# Patient Record
Sex: FEMALE | Race: WHITE
[De-identification: names, ages, dates, MRNs, and addresses within clinical notes are randomized per-mention and may not be internally consistent; named-entity substitution may affect disease eponyms.]

---

## 2017-07-26 NOTE — CR
EXAMINATION: Abdomen

 

HISTORY: Pain

 

COMPARISON: None

 

TECHNIQUE: AP and upright views

 

FINDINGS: There is no free air under the diaphragm. Cholecystectomy clips are noted. There is a nono
bstructive bowel gas pattern. No abnormal calcifications project over the kidneys. There is an IUD p
rojecting over the pelvis. Visualized osseous structures appear normal.

 

IMPRESSION: 

1. Nonobstructive bowel gas pattern.

## 2017-07-26 NOTE — EDM.PDOC
ED HPI GENERAL MEDICAL PROBLEM





- General


Chief Complaint: Abdominal Pain


Stated Complaint: ILL, STOMACH PAIN


Time Seen by Provider: 07/26/17 10:31


Source of Information: Reports: Patient


History Limitations: Reports: No Limitations





- History of Present Illness


INITIAL COMMENTS - FREE TEXT/NARRATIVE: 





HISTORY AND PHYSICAL:


[]37-year-old female presenting with abdominal pain





History of Present Illness:


[]Patient had not had a bowel movement for 5 days yes today had a bowel 

movement continues to have pain across the lower abdomen





Review of Systems:


As per history of present illness and below otherwise all 


systems reviewed and negative.  





Past medical history:


As per history of present illness and as reviewed below


otherwise noncontributory.





Surgical history:


As per history of present illness and as reviewed below


otherwise noncontributory.





Social history:


No reported history of drug or alcohol abuse.





Family history:


As per history of present illness and as reviewed below


otherwise noncontributory.





Physical exam:


Alert and oriented female answering questions appropriately exquisitely tender 

across lower abdomen no rebound no guarding


HEENT: Atraumatic, normocehpalic, pupils reactive, negative for conjunctival 

pallor or scleral icterus, mucous membranes moist, throat clear, neck supple, 

nontender, trachea midline.  


Lungs: Clear to auscultation, breath sounds equal bilaterally, chest non 

tender.  


Heart: S1S2, regular, negative for clicks, rubs, or JVD.


Abdomen: Soft, nondistended, nontender.  Negative for masses or 

hepatossplenmegaly. Positive for left costovertebral tenderness.


Pelvis: Stable nontender.


Genitourinary: Deferred.


Rectal: Deferred


Extremities: Atraumatic, negative for cords or calf pain.  


Neurovascular unremarkable.


Neuro:  Awake, alert, oriented.  Cranial nerves II through XII


unremarkable.  Cerebellum unremarkable.  Motor and sensory unremarkable 

throughout.  Exam nonfocal.  


Discussed with patient the positive findings of a urinary tract infection and 

her tenderness to the left kidney





Diagnostics:


[CBC CMP UA urine drug screen flat and upright abdomen pelvis





Therapeutics:


[]





Impression:


[Urinary tract infection early pyelonephritis]





Plan:


[]Ciprofloxacin 500 mg twice a day 7 days follow-up with your primary care 

provider in one week


Pyridium 100 mg 3 times a day 3 days





Definitive disposition and diagnosis as appropriate pending


reevaluation and review of above.  





Onset: Gradual


Duration: Day(s):


Location: Reports: Abdomen


Quality: Reports: Ache, Throbbing


Severity: Moderate


Improves with: Reports: None


Worsens with: Reports: None


  ** Right Abdomen


Pain Score (Numeric/FACES): 7





- Related Data


 Allergies











Allergy/AdvReac Type Severity Reaction Status Date / Time


 


No Known Allergies Allergy   Verified 07/26/17 10:21











Home Meds: 


 Home Meds





Ciprofloxacin HCl [Cipro] 500 mg PO Q12HR #14 tablet 07/26/17 [Rx]


Citalopram [Celexa] 40 mg DAILY 07/26/17 [History]


Levonorgestrel [Mirena]  07/26/17 [History]


Ondansetron HCl [Zofran] 8 mg TID PRN 07/26/17 [History]


Phenazopyridine HCl [Pyridium] 100 mg PO TID #9 tablet 07/26/17 [Rx]











ED ROS GENERAL





- Review of Systems


Review Of Systems: ROS reveals no pertinent complaints other than HPI.





ED EXAM, GI/ABD





- Physical Exam


Exam: See Below (See dictation)





Course





- Vital Signs


Last Recorded V/S: 


 Last Vital Signs











Temp  36.2 C   07/26/17 10:25


 


Pulse  77   07/26/17 11:51


 


Resp  18   07/26/17 11:51


 


BP  128/62   07/26/17 11:51


 


Pulse Ox  98   07/26/17 11:51














- Orders/Labs/Meds


Orders: 


 Active Orders 24 hr











 Category Date Time Status


 


 Sodium Chloride 0.9% [Saline Flush] Med  07/26/17 10:36 Active





 10 ml FLUSH ASDIRECTED PRN   


 


 Sodium Chloride 0.9% [Saline Flush] Med  07/26/17 10:36 Active





 2.5 ml FLUSH ASDIRECTED PRN   


 


 Saline Lock Insert [OM.PC] Stat Oth  07/26/17 10:36 Ordered








 Medication Orders





Sodium Chloride (Saline Flush)  10 ml FLUSH ASDIRECTED PRN


   PRN Reason: Keep Vein Open


Sodium Chloride (Saline Flush)  2.5 ml FLUSH ASDIRECTED PRN


   PRN Reason: Keep Vein Open








Labs: 


 Laboratory Tests











  07/26/17 07/26/17 07/26/17 Range/Units





  10:46 10:46 10:55 


 


WBC  8.63    (4.0-11.0)  K/uL


 


RBC  4.63    (4.30-5.90)  M/uL


 


Hgb  13.3    (12.0-16.0)  g/dL


 


Hct  40.6    (36.0-46.0)  %


 


MCV  87.7    (80.0-98.0)  fL


 


MCH  28.7    (27.0-32.0)  pg


 


MCHC  32.8    (31.0-37.0)  g/dL


 


RDW Std Deviation  43.8    (28.0-62.0)  fl


 


RDW Coeff of Ravi  14    (11.0-15.0)  %


 


Plt Count  337    (150-400)  K/uL


 


MPV  8.90    (7.40-12.00)  fL


 


Neut % (Auto)  63.0    (48.0-80.0)  %


 


Lymph % (Auto)  28.9    (16.0-40.0)  %


 


Mono % (Auto)  5.9    (0.0-15.0)  %


 


Eos % (Auto)  1.7    (0.0-7.0)  %


 


Baso % (Auto)  0.5    (0.0-1.5)  %


 


Neut # (Auto)  5.4    (1.4-5.7)  K/uL


 


Lymph # (Auto)  2.5 H    (0.6-2.4)  K/uL


 


Mono # (Auto)  0.5    (0.0-0.8)  K/uL


 


Eos # (Auto)  0.2    (0.0-0.7)  K/uL


 


Baso # (Auto)  0.0    (0.0-0.1)  K/uL


 


Nucleated RBC %  0.0    /100WBC


 


Nucleated RBCs #  0    K/uL


 


Sodium   138   (136-146)  mmol/L


 


Potassium   3.8   (3.5-5.1)  mmol/L


 


Chloride   104   ()  mmol/L


 


Carbon Dioxide   26   (21-31)  mmol/L


 


BUN   8   (6.0-23.0)  mg/dL


 


Creatinine   0.8   (0.6-1.5)  mg/dL


 


Est Cr Clr Drug Dosing   TNP   


 


Estimated GFR (MDRD)   > 60.0   ml/min


 


Glucose   78   ()  mg/dL


 


Calcium   9.6   (8.8-10.8)  mg/dL


 


Total Bilirubin   0.3   (0.1-1.5)  mg/dL


 


AST   16   (5-40)  IU/L


 


ALT   23   (8-54)  IU/L


 


Alkaline Phosphatase   83   ()  


 


Total Protein   7.5   (6.0-8.0)  g/dL


 


Albumin   4.1   (3.5-5.0)  g/dL


 


Globulin   3.4   (2.0-3.5)  g/dL


 


Albumin/Globulin Ratio   1.2 L   (1.3-2.8)  


 


Urine Color     


 


Urine Appearance     


 


Urine pH     (5.0-8.0)  


 


Ur Specific Gravity     (1.001-1.035)  


 


Urine Protein     (NEGATIVE)  mg/dL


 


Urine Glucose (UA)     (NEGATIVE)  mg/dL


 


Urine Ketones     (NEGATIVE)  mg/dL


 


Urine Occult Blood     (NEGATIVE)  


 


Urine Nitrite     (NEGATIVE)  


 


Urine Bilirubin     (NEGATIVE)  


 


Urine Urobilinogen     (<2.0)  EU/dL


 


Ur Leukocyte Esterase     (NEGATIVE)  


 


Urine RBC     (0-2/HPF)  


 


Urine WBC     (0-5/HPF)  


 


Ur Epithelial Cells     (NONE-FEW)  


 


Urine Bacteria     (NEGATIVE)  


 


Urine HCG, Qual    NEGATIVE  (NEGATIVE)  














  07/26/17 Range/Units





  10:55 


 


WBC   (4.0-11.0)  K/uL


 


RBC   (4.30-5.90)  M/uL


 


Hgb   (12.0-16.0)  g/dL


 


Hct   (36.0-46.0)  %


 


MCV   (80.0-98.0)  fL


 


MCH   (27.0-32.0)  pg


 


MCHC   (31.0-37.0)  g/dL


 


RDW Std Deviation   (28.0-62.0)  fl


 


RDW Coeff of Ravi   (11.0-15.0)  %


 


Plt Count   (150-400)  K/uL


 


MPV   (7.40-12.00)  fL


 


Neut % (Auto)   (48.0-80.0)  %


 


Lymph % (Auto)   (16.0-40.0)  %


 


Mono % (Auto)   (0.0-15.0)  %


 


Eos % (Auto)   (0.0-7.0)  %


 


Baso % (Auto)   (0.0-1.5)  %


 


Neut # (Auto)   (1.4-5.7)  K/uL


 


Lymph # (Auto)   (0.6-2.4)  K/uL


 


Mono # (Auto)   (0.0-0.8)  K/uL


 


Eos # (Auto)   (0.0-0.7)  K/uL


 


Baso # (Auto)   (0.0-0.1)  K/uL


 


Nucleated RBC %   /100WBC


 


Nucleated RBCs #   K/uL


 


Sodium   (136-146)  mmol/L


 


Potassium   (3.5-5.1)  mmol/L


 


Chloride   ()  mmol/L


 


Carbon Dioxide   (21-31)  mmol/L


 


BUN   (6.0-23.0)  mg/dL


 


Creatinine   (0.6-1.5)  mg/dL


 


Est Cr Clr Drug Dosing   


 


Estimated GFR (MDRD)   ml/min


 


Glucose   ()  mg/dL


 


Calcium   (8.8-10.8)  mg/dL


 


Total Bilirubin   (0.1-1.5)  mg/dL


 


AST   (5-40)  IU/L


 


ALT   (8-54)  IU/L


 


Alkaline Phosphatase   ()  


 


Total Protein   (6.0-8.0)  g/dL


 


Albumin   (3.5-5.0)  g/dL


 


Globulin   (2.0-3.5)  g/dL


 


Albumin/Globulin Ratio   (1.3-2.8)  


 


Urine Color  YELLOW  


 


Urine Appearance  HAZY  


 


Urine pH  6.0  (5.0-8.0)  


 


Ur Specific Gravity  1.025  (1.001-1.035)  


 


Urine Protein  NEGATIVE  (NEGATIVE)  mg/dL


 


Urine Glucose (UA)  NEGATIVE  (NEGATIVE)  mg/dL


 


Urine Ketones  NEGATIVE  (NEGATIVE)  mg/dL


 


Urine Occult Blood  SMALL H  (NEGATIVE)  


 


Urine Nitrite  POSITIVE H  (NEGATIVE)  


 


Urine Bilirubin  NEGATIVE  (NEGATIVE)  


 


Urine Urobilinogen  0.2  (<2.0)  EU/dL


 


Ur Leukocyte Esterase  LARGE  (NEGATIVE)  


 


Urine RBC  2-4  (0-2/HPF)  


 


Urine WBC  TO NUMEROUS TO COUNT H  (0-5/HPF)  


 


Ur Epithelial Cells  FEW  (NONE-FEW)  


 


Urine Bacteria  2+ H  (NEGATIVE)  


 


Urine HCG, Qual   (NEGATIVE)  











Meds: 


Medications











Generic Name Dose Route Start Last Admin





  Trade Name Freq  PRN Reason Stop Dose Admin


 


Sodium Chloride  10 ml  07/26/17 10:36  





  Saline Flush  FLUSH   





  ASDIRECTED PRN   





  Keep Vein Open   


 


Sodium Chloride  2.5 ml  07/26/17 10:36  





  Saline Flush  FLUSH   





  ASDIRECTED PRN   





  Keep Vein Open   














Departure





- Departure


Time of Disposition: 11:56


Disposition: Home, Self-Care 01


Condition: Good


Clinical Impression: 


 Pyelonephritis





UTI (urinary tract infection)


Qualifiers:


 Urinary tract infection type: acute cystitis Hematuria presence: without 

hematuria Qualified Code(s): N30.00 - Acute cystitis without hematuria








- Discharge Information


Prescriptions: 


Ciprofloxacin HCl [Cipro] 500 mg PO Q12HR #14 tablet


Phenazopyridine HCl [Pyridium] 100 mg PO TID #9 tablet


Instructions:  Abdominal Pain, Adult, Easy-to-Read


Forms:  ED Department Discharge


Additional Instructions: 


The following information is given to patients seen in the emergency department 

who are being discharged to home. This information is to outline your options 

for follow-up care. We provide all patients seen in our emergency department 

with a follow-up referral.





The need for follow-up, as well as the timing and circumstances, are variable 

depending upon the specifics of your emergency department visit.





If you don't have a primary care physician on staff, we will provide you with a 

referral. We always advise you to contact your personal physician following an 

emergency department visit to inform them of the circumstance of the visit and 

for follow-up with them and/or the need for any referrals to a consulting 

specialist.





The emergency department will also refer you to a specialist when appropriate. 

This referral assures that you have the opportunity for followup care with a 

specialist. All of these measure are taken in an effort to provide you with 

optimal care, which includes your followup.





Under all circumstances we always encourage you to contact your private 

physician who remains a resource for coordinating  your care. When calling for 

followup care, please make the office aware that this follow-up is from your 

recent emergency room visit. If for any reason you are refused follow-up, 

please contact the [facility] emergency department at [phone] 

and asked to speak to the emergency department charge nurse.





Prescriptions have been electronically sent to service drug per your request


Pyridium 100 mg 3 times daily 3 days


Ciprofloxacin 500 mg twice a day 7 days





Follow-up with your primary care provider in one week to have urine reevaluated





- My Orders


Last 24 Hours: 


My Active Orders





07/26/17 10:36


Sodium Chloride 0.9% [Saline Flush]   10 ml FLUSH ASDIRECTED PRN 


Sodium Chloride 0.9% [Saline Flush]   2.5 ml FLUSH ASDIRECTED PRN 


Saline Lock Insert [OM.PC] Stat 














- Assessment/Plan


Last 24 Hours: 


My Active Orders





07/26/17 10:36


Sodium Chloride 0.9% [Saline Flush]   10 ml FLUSH ASDIRECTED PRN 


Sodium Chloride 0.9% [Saline Flush]   2.5 ml FLUSH ASDIRECTED PRN 


Saline Lock Insert [OM.PC] Stat

## 2017-07-28 NOTE — EDM.PDOC
ED HPI GENERAL MEDICAL PROBLEM





- General


Chief Complaint: Abdominal Pain


Stated Complaint: ABDOMINAL PAIN


Time Seen by Provider: 17 19:25





- History of Present Illness


INITIAL COMMENTS - FREE TEXT/NARRATIVE: 





HISTORY AND PHYSICAL:





History of present illness:


The patient is a 37-year-old female who was seen here 2 days ago for 3 weeks of 

right flank and right lower abdominal pain associated with nausea and vomiting. 

She was worked up with abdominal x-rays labs UA and urine culture and was 

diagnosed with a UTI and placed on Cipro and radium. Her urine culture has been 

checked by me and the Escherichia coli UTI is sensitive to Cipro. The patient 

presents today with persistent pain and not feeling much better and saying that 

the pyridium is making her vomit. She has had intractable vomiting today but no 

diarrhea and is in fact been constipated. The patient says that everything 

started about 3 weeks ago when she had the "stomach flu" where she was having 

loose stools stomach pain and some vomiting. That improved and then she became 

more constipated and then Restarted having the abdominal pain but it was more 

localized on the right side and in the suprapubic area. The patient has a 

Mirena and denies pregnancy and currently has not had fevers or chills chest 

pain or shortness of breath. She comes in tonight because she feels that she's 

not improving and now she is having intractable vomiting. She describes the 

pain as sharp and achy as well as crampy and it is now more diffuse across the 

whole lower abdominal area. She has a history of a cholecystectomy but no other 

gynecologic issues.





Review of systems: 


As per history of present illness and below otherwise all systems reviewed and 

negative.





Past medical history: 


As per history of present illness and as reviewed below otherwise 

noncontributory.





Surgical history: 


As per history of present illness and as reviewed below otherwise 

noncontributory.





Social history: 


No reported history of drug or alcohol abuse.





Family history: 


As per history of present illness and as reviewed below otherwise 

noncontributory.





Physical exam:


Gen.: Well-developed overweight female who is nontoxic but looks somewhat 

uncomfortable in the room. Vital signs been noted by me and she is afebrile


HEENT: Atraumatic, normocephalic, pupils reactive, negative for conjunctival 

pallor or scleral icterus, mucous membranes slightly tacky, throat clear, neck 

supple, nontender, trachea midline.


Lungs: Clear to auscultation, breath sounds equal bilaterally, chest nontender.


Heart: S1S2, regular, negative for clicks, rubs, or JVD.


Abdomen: Soft, nondistended, bowel sounds are hypoactive. The patient is 

diffusely tender across the entire lower abdominal area and has voluntary 

guarding but no involuntary guarding or rebound. Exam was somewhat exaggerated 

with even mild palpation. Negative for masses or hepatosplenomegaly. Negative 

for costovertebral tenderness.


Pelvis: Stable nontender.


Genitourinary: Deferred.


Rectal: Deferred.


Extremities: Atraumatic, negative for cords or calf pain. Neurovascular 

unremarkable.


Neuro: Awake, alert, oriented. Cranial nerves II through XII unremarkable. 

Cerebellum unremarkable. Motor and sensory unremarkable throughout. Exam 

nonfocal.





Diagnostics:


CBC CMP lipase UA UG CT scan of the abdomen and pelvis rocephin


I will not send a urine culture as she or he has one from 2 days ago but I will 

send a UA to evaluate improvement from the prior UA.





Therapeutics:


IV IV fluids Zofran Toradol Dilaudid


Patient continued to have dry heaves so was given Reglan and Benadryl





Patient is aware of all testing results including the CT scan and the findings 

of the 2 small left kidney stones in the left adnexal cyst. She is feeling much 

improved and is taking by mouth ice chips and a popsicle and is no longer 

having vomiting. I will give her Zofran for home as well as some tramadol and 

encouraged her to continue her antibiotics until they are finished and need for 

follow-up in the clinic. Advised on reasons to return to the ED. I advised her 

to stop taking the Pyridium as that seems distressed triggered her vomiting.


Impression: 


Abdominal pain and vomiting persistent history of UTI, improved





Definitive disposition and diagnosis as appropriate pending reevaluation and 

review of above.


  ** right flank


Pain Score (Numeric/FACES): 10





- Related Data


 Allergies











Allergy/AdvReac Type Severity Reaction Status Date / Time


 


No Known Allergies Allergy   Verified 17 19:14











Home Meds: 


 Home Meds





Ciprofloxacin HCl [Cipro] 500 mg PO Q12HR #14 tablet 17 [Rx]


Citalopram [Celexa] 40 mg DAILY 17 [History]


Levonorgestrel [Mirena]  17 [History]


Ondansetron HCl [Zofran] 8 mg TID PRN 17 [History]


Phenazopyridine HCl [Pyridium] 100 mg PO TID #9 tablet 17 [Rx]











Past Medical History





- Past Health History


Medical/Surgical History: Denies Medical/Surgical History


HEENT History: Reports: None


Cardiovascular History: Reports: None


Respiratory History: Reports: None


Gastrointestinal History: Reports: GERD


Genitourinary History: Reports: None


OB/GYN History: Reports: Pregnancy


Musculoskeletal History: Reports: None


Psychiatric History: Reports: None


Endocrine/Metabolic History: Reports: None


Hematologic History: Reports: None


Immunologic History: Reports: None


Oncologic (Cancer) History: Reports: None


Dermatologic History: Reports: None





- Past Surgical History


Head Surgeries/Procedures: Reports: None


GI Surgical History: Reports: Cholecystectomy, Colonoscopy, EGD


Female  Surgical History: Reports:  Section





Social & Family History





- Family History


Family Medical History: Noncontributory





- Tobacco Use


Smoking Status *Q: Never Smoker





- Caffeine Use


Caffeine Use: Reports: Soda





- Recreational Drug Use


Recreational Drug Use: No





ED ROS GENERAL





- Review of Systems


Review Of Systems: ROS reveals no pertinent complaints other than HPI.





ED EXAM, GENERAL





- Physical Exam


Exam: See Below (See dictation)





Course





- Vital Signs


Last Recorded V/S: 


 Last Vital Signs











Temp  36.5 C   17 19:15


 


Pulse  65   17 21:14


 


Resp  16   17 21:14


 


BP  131/77   17 21:14


 


Pulse Ox  97   17 21:14














- Orders/Labs/Meds


Orders: 


 Active Orders 24 hr











 Category Date Time Status


 


 Abdomen Pelvis wo Cont [CT] Stat Exams  17 19:31 Taken


 


 Sodium Chloride 0.9% [Saline Flush] Med  17 19:31 Active





 10 ml FLUSH ASDIRECTED PRN   


 


 Sodium Chloride 0.9% [Saline Flush] Med  17 19:31 Active





 2.5 ml FLUSH ASDIRECTED PRN   


 


 cefTRIAXone [Rocephin in Dextrose,Iso-Osm 1 GM/50 ML] 1 Med  17 21:39 

Active





 gm   





 Premix Bag 1 bag   





 IV ONETIME   


 


 Saline Lock Insert [OM.PC] Stat Oth  17 19:31 Ordered








 Medication Orders





Ceftriaxone Sodium/Dextrose 1 (gm/ Premix)  50 mls @ 100 mls/hr IV ONETIME ONE


   Stop: 17 22:08


Sodium Chloride (Saline Flush)  10 ml FLUSH ASDIRECTED PRN


   PRN Reason: Keep Vein Open


Sodium Chloride (Saline Flush)  2.5 ml FLUSH ASDIRECTED PRN


   PRN Reason: Keep Vein Open








Labs: 


 Laboratory Tests











  17 Range/Units





  19:30 19:30 19:30 


 


WBC  10.35    (4.0-11.0)  K/uL


 


RBC  4.82    (4.30-5.90)  M/uL


 


Hgb  13.6    (12.0-16.0)  g/dL


 


Hct  41.7    (36.0-46.0)  %


 


MCV  86.5    (80.0-98.0)  fL


 


MCH  28.2    (27.0-32.0)  pg


 


MCHC  32.6    (31.0-37.0)  g/dL


 


RDW Std Deviation  42.5    (28.0-62.0)  fl


 


RDW Coeff of Ravi  13    (11.0-15.0)  %


 


Plt Count  375    (150-400)  K/uL


 


MPV  9.00    (7.40-12.00)  fL


 


Neut % (Auto)  70.7    (48.0-80.0)  %


 


Lymph % (Auto)  23.6    (16.0-40.0)  %


 


Mono % (Auto)  4.4    (0.0-15.0)  %


 


Eos % (Auto)  1.0    (0.0-7.0)  %


 


Baso % (Auto)  0.3    (0.0-1.5)  %


 


Neut # (Auto)  7.3 H    (1.4-5.7)  K/uL


 


Lymph # (Auto)  2.4    (0.6-2.4)  K/uL


 


Mono # (Auto)  0.5    (0.0-0.8)  K/uL


 


Eos # (Auto)  0.1    (0.0-0.7)  K/uL


 


Baso # (Auto)  0.0    (0.0-0.1)  K/uL


 


Nucleated RBC %  0.0    /100WBC


 


Nucleated RBCs #  0    K/uL


 


Sodium   139   (136-146)  mmol/L


 


Potassium   3.9   (3.5-5.1)  mmol/L


 


Chloride   103   ()  mmol/L


 


Carbon Dioxide   27   (21-31)  mmol/L


 


BUN   8   (6.0-23.0)  mg/dL


 


Creatinine   0.9   (0.6-1.5)  mg/dL


 


Est Cr Clr Drug Dosing   TNP   


 


Estimated GFR (MDRD)   > 60.0   ml/min


 


Glucose   99   ()  mg/dL


 


Calcium   9.5   (8.8-10.8)  mg/dL


 


Total Bilirubin   0.5   (0.1-1.5)  mg/dL


 


AST   18   (5-40)  IU/L


 


ALT   21   (8-54)  IU/L


 


Alkaline Phosphatase   86   ()  


 


Total Protein   7.4   (6.0-8.0)  g/dL


 


Albumin   4.1   (3.5-5.0)  g/dL


 


Globulin   3.3   (2.0-3.5)  g/dL


 


Albumin/Globulin Ratio   1.2 L   (1.3-2.8)  


 


Lipase   20   (7-80)  U/L


 


HCG, Qual    NEGATIVE  (NEG)  


 


Urine Color     


 


Urine Appearance     


 


Urine pH     (5.0-8.0)  


 


Ur Specific Gravity     (1.001-1.035)  


 


Urine Protein     (NEGATIVE)  mg/dL


 


Urine Glucose (UA)     (NEGATIVE)  mg/dL


 


Urine Ketones     (NEGATIVE)  mg/dL


 


Urine Occult Blood     (NEGATIVE)  


 


Urine Nitrite     (NEGATIVE)  


 


Urine Bilirubin     (NEGATIVE)  


 


Urine Ictotest     


 


Urine Urobilinogen     (<2.0)  EU/dL


 


Ur Leukocyte Esterase     (NEGATIVE)  


 


Urine RBC     (0-2/HPF)  


 


Urine WBC     (0-5/HPF)  


 


Ur Epithelial Cells     (NONE-FEW)  


 


Urine Bacteria     (NEGATIVE)  














  17 Range/Units





  20:30 


 


WBC   (4.0-11.0)  K/uL


 


RBC   (4.30-5.90)  M/uL


 


Hgb   (12.0-16.0)  g/dL


 


Hct   (36.0-46.0)  %


 


MCV   (80.0-98.0)  fL


 


MCH   (27.0-32.0)  pg


 


MCHC   (31.0-37.0)  g/dL


 


RDW Std Deviation   (28.0-62.0)  fl


 


RDW Coeff of Ravi   (11.0-15.0)  %


 


Plt Count   (150-400)  K/uL


 


MPV   (7.40-12.00)  fL


 


Neut % (Auto)   (48.0-80.0)  %


 


Lymph % (Auto)   (16.0-40.0)  %


 


Mono % (Auto)   (0.0-15.0)  %


 


Eos % (Auto)   (0.0-7.0)  %


 


Baso % (Auto)   (0.0-1.5)  %


 


Neut # (Auto)   (1.4-5.7)  K/uL


 


Lymph # (Auto)   (0.6-2.4)  K/uL


 


Mono # (Auto)   (0.0-0.8)  K/uL


 


Eos # (Auto)   (0.0-0.7)  K/uL


 


Baso # (Auto)   (0.0-0.1)  K/uL


 


Nucleated RBC %   /100WBC


 


Nucleated RBCs #   K/uL


 


Sodium   (136-146)  mmol/L


 


Potassium   (3.5-5.1)  mmol/L


 


Chloride   ()  mmol/L


 


Carbon Dioxide   (21-31)  mmol/L


 


BUN   (6.0-23.0)  mg/dL


 


Creatinine   (0.6-1.5)  mg/dL


 


Est Cr Clr Drug Dosing   


 


Estimated GFR (MDRD)   ml/min


 


Glucose   ()  mg/dL


 


Calcium   (8.8-10.8)  mg/dL


 


Total Bilirubin   (0.1-1.5)  mg/dL


 


AST   (5-40)  IU/L


 


ALT   (8-54)  IU/L


 


Alkaline Phosphatase   ()  


 


Total Protein   (6.0-8.0)  g/dL


 


Albumin   (3.5-5.0)  g/dL


 


Globulin   (2.0-3.5)  g/dL


 


Albumin/Globulin Ratio   (1.3-2.8)  


 


Lipase   (7-80)  U/L


 


HCG, Qual   (NEG)  


 


Urine Color  ORANGE  


 


Urine Appearance  CLEAR  


 


Urine pH  5.0  (5.0-8.0)  


 


Ur Specific Gravity  >= 1.030  (1.001-1.035)  


 


Urine Protein  30  (NEGATIVE)  mg/dL


 


Urine Glucose (UA)  100 H  (NEGATIVE)  mg/dL


 


Urine Ketones  NEGATIVE  (NEGATIVE)  mg/dL


 


Urine Occult Blood  NEGATIVE  (NEGATIVE)  


 


Urine Nitrite  POSITIVE H  (NEGATIVE)  


 


Urine Bilirubin  MODERATE H  (NEGATIVE)  


 


Urine Ictotest  POSITIVE  


 


Urine Urobilinogen  4.0 H  (<2.0)  EU/dL


 


Ur Leukocyte Esterase  TRACE  (NEGATIVE)  


 


Urine RBC  0-1  (0-2/HPF)  


 


Urine WBC  0-3  (0-5/HPF)  


 


Ur Epithelial Cells  RARE  (NONE-FEW)  


 


Urine Bacteria  FEW  (NEGATIVE)  











Meds: 


Medications











Generic Name Dose Route Start Last Admin





  Trade Name Jeromeq  PRN Reason Stop Dose Admin


 


Ceftriaxone Sodium/Dextrose 1  50 mls @ 100 mls/hr  17 21:39  





  gm/ Premix  IV  17 22:08  





  ONETIME ONE   


 


Sodium Chloride  10 ml  17 19:31  





  Saline Flush  FLUSH   





  ASDIRECTED PRN   





  Keep Vein Open   


 


Sodium Chloride  2.5 ml  17 19:31  





  Saline Flush  FLUSH   





  ASDIRECTED PRN   





  Keep Vein Open   














Discontinued Medications














Generic Name Dose Route Start Last Admin





  Trade Name Jeromeq  PRN Reason Stop Dose Admin


 


Diphenhydramine HCl  50 mg  17 20:41  17 20:46





  Benadryl  IVPUSH  17 20:42  50 mg





  ONETIME ONE   Administration


 


Diphenhydramine HCl  Confirm  17 20:42  17 20:59





  Benadryl  Administered  17 20:43  Not Given





  Dose   





  50 mg   





  .ROUTE   





  .STK-MED ONE   


 


Hydromorphone HCl  1 mg  17 19:31  17 19:42





  Dilaudid  IVPUSH  17 19:32  1 mg





  ONETIME ONE   Administration


 


Sodium Chloride  1,000 mls @ 999 mls/hr  17 19:31  17 19:40





  Normal Saline  IV  17 20:31  999 mls/hr





  STAT ONE   Administration


 


Ketorolac Tromethamine  30 mg  17 19:31  17 19:40





  Toradol  IVPUSH  17 19:32  30 mg





  ONETIME ONE   Administration


 


Metoclopramide HCl  10 mg  17 20:41  17 20:42





  Reglan  IV  17 20:42  10 mg





  ONETIME ONE   Administration


 


Metoclopramide HCl  Confirm  17 20:42  17 20:59





  Reglan  Administered  17 20:43  Not Given





  Dose   





  10 mg   





  .ROUTE   





  .STK-MED ONE   


 


Ondansetron HCl  4 mg  17 19:31  17 19:40





  Zofran Odt  PO  17 19:32  4 mg





  ONETIME ONE   Administration


 


Ondansetron HCl  4 mg  17 20:14  17 20:15





  Zofran  IVPUSH  17 20:15  4 mg





  ONETIME ONE   Administration


 


Ondansetron HCl  Confirm  17 20:13  17 20:18





  Zofran  Administered  17 20:14  Not Given





  Dose   





  4 mg   





  .ROUTE   





  .STK-MED ONE   














Departure





- Departure


Time of Disposition: 21:51


Disposition: Home, Self-Care 01


Condition: Good


Clinical Impression: 


Abdominal pain


Qualifiers:


 Abdominal location: right lower quadrant Qualified Code(s): R10.31 - Right 

lower quadrant pain





Vomiting


Qualifiers:


 Vomiting type: unspecified Vomiting Intractability: non-intractable Nausea 

presence: with nausea Qualified Code(s): R11.2 - Nausea with vomiting, 

unspecified








- Discharge Information


Forms:  ED Department Discharge


Additional Instructions: 


The following information is given to patients seen in the emergency department 

who are being discharged to home. This information is to outline your options 

for follow-up care. We provide all patients seen in our emergency department 

with a follow-up referral.





The need for follow-up, as well as the timing and circumstances, are variable 

depending upon the specifics of your emergency department visit.





If you don't have a primary care physician on staff, we will provide you with a 

referral. We always advise you to contact your personal physician following an 

emergency department visit to inform them of the circumstance of the visit and 

for follow-up with them and/or the need for any referrals to a consulting 

specialist.





The emergency department will also refer you to a specialist when appropriate. 

This referral assures that you have the opportunity for followup care with a 

specialist. All of these measure are taken in an effort to provide you with 

optimal care, which includes your followup.





Under all circumstances we always encourage you to contact your private 

physician who remains a resource for coordinating  your care. When calling for 

followup care, please make the office aware that this follow-up is from your 

recent emergency room visit. If for any reason you are refused follow-up, 

please contact the [facility] emergency 

department at [phone] and ask to speak to the emergency department 

charge nurse.





[facility] 


Primary care- Internal Medicine and Family [address] [address]


[phone]








Please continue and finish the antibiotics that you are taking and stopped 

taking the high rate and as it seems to be triggering or vomiting. These use 

Zofran for nausea and vomiting and use the tramadol for pain as needed. He may 

also take over-the-counter ibuprofen/Motrin for the discomfort. Push hydration 

rest and please call and follow-up with one of our clinic providers in the next 

few days and return here as needed and as discussed.





Patient was given Zofran and tramadol from [facility]





- My Orders


Last 24 Hours: 


My Active Orders





17 19:31


Abdomen Pelvis wo Cont [CT] Stat 


Sodium Chloride 0.9% [Saline Flush]   10 ml FLUSH ASDIRECTED PRN 


Sodium Chloride 0.9% [Saline Flush]   2.5 ml FLUSH ASDIRECTED PRN 


Saline Lock Insert [OM.PC] Stat 





17 21:39


cefTRIAXone [Rocephin in Dextrose,Iso-Osm 1 GM/50 ML] 1 gm   Premix Bag 1 bag 

IV ONETIME 














- Assessment/Plan


Last 24 Hours: 


My Active Orders





17 19:31


Abdomen Pelvis wo Cont [CT] Stat 


Sodium Chloride 0.9% [Saline Flush]   10 ml FLUSH ASDIRECTED PRN 


Sodium Chloride 0.9% [Saline Flush]   2.5 ml FLUSH ASDIRECTED PRN 


Saline Lock Insert [OM.PC] Stat 





17 21:39


cefTRIAXone [Rocephin in Dextrose,Iso-Osm 1 GM/50 ML] 1 gm   Premix Bag 1 bag 

IV ONETIME

## 2017-07-31 NOTE — CT
EXAM DATE: 17



PATIENT'S AGE: 37





Patient: ODIN OSPINA



Facility: [place], [place]

Patient ID: [id number]

Site Patient ID: [id number].

Site Accession #: [id number].

: 1980

Study: CT Abdomen/Pelvis [id number]/28/2017 9:26:33 PM

Ordering Physician: Yamile Chandra



Final Report: 

INDICATION:

Abdominal pain 



TECHNIQUE:

CT abdomen and pelvis without contrast.



COMPARISON:

None 



FINDINGS:

Lower chest: Unremarkable. 

Liver: Unremarkable. 

Spleen: Unremarkable. 

Pancreas: Unremarkable. 

Gallbladder and bile ducts: Status post cholecystectomy. 

Adrenal glands: Unremarkable. 

Kidneys: Two nonobstructing stones in the left kidney, the largest measuring 3 
mm in diameter. No hydronephrosis. 

GI tract: Unremarkable. Appendix is normal. 

Vascular structures: Unremarkable. 

Lymph nodes: Unremarkable. 

Miscellaneous: Unremarkable. No free air or significant free fluid. 

Pelvic Organs: 3.3 x 2.1 cm cystic lesion in the left adnexa. There is an IUD 
within the uterus. 

Bones: Unremarkable for age. 



IMPRESSION:

Left nephrolithiasis without hydronephrosis. 

No acute intra-abdominal process identified. 

3 cm cystic lesion left adnexa. Pelvic ultrasound may be useful for further 
evaluation if clinically indicated. 

There is an IUD within the uterus. 

Status post cholecystectomy.



Please note that all CT scans at this facility use dose modulation, iterative 
reconstruction, and/or weight-based dosing when appropriate to reduce radiation 
dose to as low as reasonably achievable.



Dictated by Agata Starks MD @ 2017 9:35PM

(Electronic Signature)



Report Signed by Proxy.
MTDD

## 2018-01-11 NOTE — EDM.PDOC
<Halina Alonzo - Last Filed: 18 21:27>





ED HPI GENERAL MEDICAL PROBLEM





- General


Chief Complaint: Upper Extremity Injury/Pain


Stated Complaint: PT FELL AND HURT RT ARM


Time Seen by Provider: 18 21:22


Source of Information: Reports: Patient


History Limitations: Reports: No Limitations





- History of Present Illness


INITIAL COMMENTS - FREE TEXT/NARRATIVE: 


HISTORY AND PHYSICAL:





History of present illness:


[Patient comes to the emergency room complaining of right arm pain. States that 

she slipped on some ice around 2 PM this afternoon causing her to fall. She 

believes she landed with both arms flexed at the elbow, landing on her elbows. 

She is having pain from her right shoulder to her mid forearm. She is having 

some mild numbness and tingling to her fingers. Her arm has gradually stiffened 

up throughout today. She's taken some Excedrin which has helped her symptoms 

somewhat.]





Review of systems: 


As per history of present illness and below otherwise all systems reviewed and 

negative.





Past medical history: 


As per history of present illness and as reviewed below otherwise 

noncontributory.





Surgical history: 


As per history of present illness and as reviewed below otherwise 

noncontributory.





Social history: 


No reported history of drug or alcohol abuse.





Family history: 


As per history of present illness and as reviewed below otherwise 

noncontributory.





Physical exam:


HEENT: Atraumatic, normocephalic., pupils reactive, negative for conjunctival 

pallor or scleral icterus, mucous membranes moist, throat clear, neck supple, 

nontender, trachea midline.


Lungs: Clear to auscultation, breath sounds equal bilaterally, chest nontender.


Heart: S1S2, regular, negative for clicks, rubs, or JVD.


Abdomen: Soft, nondistended, nontender. Negative for masses or 

hepatosplenomegaly. Negative for costovertebral tenderness.


Pelvis: Stable nontender.


Genitourinary: Deferred.


Rectal: Deferred.


Extremities: Tender with palpation over right humerus elbow and forearm. 

Decreased range of motion with flexion and extension of her elbow. Decreased 

range of motion with rotation of her shoulder. She has full range of motion to 

her right wrist but admits to some pain in her proximal forearm. No lacerations 

or ecchymosis is appreciated. Muscles feel tight with palpation. Left elbow is 

ecchymotic. She has full range of motion to her left upper extremity. 

Neurovascular unremarkable.


Neuro: Awake, alert, oriented. Motor and sensory unremarkable throughout. Exam 

nonfocal.





Diagnostics:


[Left humerus x-ray, left elbow x-ray, left forearm x-ray]





Therapeutics:


[]





Impression: 


[]





Plan:


[]





Definitive disposition and diagnosis as appropriate pending reevaluation and 

review of above.











- Related Data


 Allergies











Allergy/AdvReac Type Severity Reaction Status Date / Time


 


No Known Allergies Allergy   Verified 18 21:05











Home Meds: 


 Home Meds





Citalopram [Celexa] 40 mg PO DAILY 17 [History]


Levonorgestrel [Mirena] 1 unit .ROUTE ASDIRECTED 17 [History]











Past Medical History





- Past Health History


Medical/Surgical History: Denies Medical/Surgical History


HEENT History: Reports: None


Cardiovascular History: Reports: None


Respiratory History: Reports: None


Gastrointestinal History: Reports: GERD


Genitourinary History: Reports: None


OB/GYN History: Reports: None


Musculoskeletal History: Reports: None


Neurological History: Reports: Migraines


Psychiatric History: Reports: None


Endocrine/Metabolic History: Reports: None


Hematologic History: Reports: None


Immunologic History: Reports: None


Oncologic (Cancer) History: Reports: None


Dermatologic History: Reports: None





- Infectious Disease History


Infectious Disease History: Reports: Chicken Pox





- Past Surgical History


Head Surgeries/Procedures: Reports: None


GI Surgical History: Reports: Cholecystectomy, Colonoscopy, EGD


Female  Surgical History: Reports:  Section





Social & Family History





- Family History


Family Medical History: Noncontributory





- Tobacco Use


Smoking Status *Q: Former Smoker


Used Tobacco, but Quit: Yes


Month Tobacco Last Used: 





- Caffeine Use


Caffeine Use: Reports: Soda





- Recreational Drug Use


Recreational Drug Use: No





Review of Systems





- Review of Systems


Review Of Systems: ROS reveals no pertinent complaints other than HPI.





ED EXAM, GENERAL





- Physical Exam


Exam: See Below





Course





- Vital Signs


Last Recorded V/S: 





 Last Vital Signs











Temp  36.3 C   18 21:03


 


Pulse  92   18 21:03


 


Resp  12   18 21:03


 


BP  141/67 H  18 21:03


 


Pulse Ox  98   18 21:03














- Orders/Labs/Meds


Orders: 





 Active Orders 24 hr











 Category Date Time Status


 


 Elbow 2V Rt [CR] Stat Exams  18 21:26 Taken


 


 Forearm 2V Rt [CR] Stat Exams  18 21:26 Taken


 


 Humerus Rt [CR] Stat Exams  18 21:26 Taken


 


 DME for Discharge [COMM] Stat Oth  18 22:33 Ordered














Departure





- Departure


Disposition: Home, Self-Care 01


Clinical Impression: 


Left upper arm injury


Qualifiers:


 Encounter type: initial encounter Qualified Code(s): S49.92XA - Unspecified 

injury of left shoulder and upper arm, initial encounter








- Discharge Information


Referrals: 


PCP,None [Primary Care Provider] - 


Forms:  ED Department Discharge


Additional Instructions: 


The following information is given to patients seen in the emergency department 

who are being discharged to home. This information is to outline your options 

for follow-up care. We provide all patients seen in our emergency department 

with a follow-up referral.





The need for follow-up, as well as the timing and circumstances, are variable 

depending upon the specifics of your emergency department visit.





If you don't have a primary care physician on staff, we will provide you with a 

referral. We always advise you to contact your personal physician following an 

emergency department visit to inform them of the circumstance of the visit and 

for follow-up with them and/or the need for any referrals to a consulting 

specialist.





The emergency department will also refer you to a specialist when appropriate. 

This referral assures that you have the opportunity for followup care with a 

specialist. All of these measure are taken in an effort to provide you with 

optimal care, which includes your followup.





Under all circumstances we always encourage you to contact your private 

physician who remains a resource for coordinating  your care. When calling for 

followup care, please make the office aware that this follow-up is from your 

recent emergency room visit. If for any reason you are refused follow-up, 

please contact the [facility] emergency 

department at [phone] and ask to speak to the emergency department 

charge nurse.





[facility] 


Specialty Care--Orthopedic clinic


 [facility]


[address] [place] [id number]


[phone]








Use over-the-counter ibuprofen/Motrin and Tylenol for pain, ice and elevate and 

use sling as needed. If the pain persists please call and follow-up in our 

orthopedics clinic by calling the number above. Return to ER as needed and as 

discussed





- My Orders


Last 24 Hours: 





My Active Orders





18 22:33


DME for Discharge [COMM] Stat 














- Assessment/Plan


Last 24 Hours: 





My Active Orders





18 22:33


DME for Discharge [COMM] Stat 














<Corazon Ovalle - Last Filed: 18 22:34>





ED HPI GENERAL MEDICAL PROBLEM





- History of Present Illness


INITIAL COMMENTS - FREE TEXT/NARRATIVE: 





All x-rays were negative for fracture or acute abnormality and a sling was 

offered to the patient.





Impression left upper extremity injury stable





Departure





- Departure


Time of Disposition: 22:34


Condition: Good

## 2018-01-12 NOTE — CR
EXAM DATE: 18



PATIENT'S AGE: 37



Patient: ODIN OSPINA



Facility: [place], [place]

Patient ID: [id number]

Site Patient ID: [id number].

Site Accession #: [id number]

: 1980

Study: XRay Extremity Right forearm [id number]/11/2018 10:13:42 PM

Ordering Physician: Doctor Temp



Final Report: 

INDICATION: Fall, forearm pain



TECHNIQUE: Forearm radiograph 2 views right



COMPARISON: None



FINDINGS: 

Bones: No acute fractures or aggressive bone lesions are identified. 



Joints: The visualized radiocarpal and elbow joints are unremarkable, but the 
elbow joint is not profiled. If there is pain or tenderness in this region, 
dedicated views of the elbow are recommended. 



Soft tissues: Unremarkable. No radiopaque foreign bodies are seen. 



IMPRESSION: 

1. No acute osseous injuries or abnormalities are noted. 



Dictated by: Denilson Andersen MD @ 2018 22:20:40

(Electronic Signature)



Report Signed by Proxy.
SUGEY

## 2018-01-12 NOTE — CR
EXAM DATE: 18



PATIENT'S AGE: 37



Patient: ODIN OSPINA



Facility: [place], [place]

Patient ID: [id number]

Site Patient ID: [id number].

Site Accession #: [id number].

: 1980

Study: XRay Extremity Right humerus [id number]/11/2018 10:14:29 PM

Ordering Physician: Doctor Temp



Final Report: 

INDICATION: Fall, arm pain



TECHNIQUE: Humerus radiograph 3 views right



COMPARISON: None



FINDINGS: 



Bones: No acute fractures or aggressive bone lesions are identified. 



Joints: The visualized glenohumeral and elbow joints are unremarkable, but the 
elbow joint is not profiled. If there is pain or tenderness in this region, 
dedicated views of the elbow are recommended. 



Soft tissues: The visualized hemithorax and soft tissues are unremarkable in 
appearance. No radiopaque foreign bodies are seen. 



IMPRESSION: 

1. No acute osseous injuries or abnormalities are noted. 



Dictated by: Denilson Andersen MD @ 2018 22:21:37

(Electronic Signature)



Report Signed by Proxy.
SUGEY

## 2018-01-12 NOTE — CR
EXAM DATE: 18



PATIENT'S AGE: 37



Patient: ODIN OSPINA



Facility: [place], [place]

Patient ID: [id number]

Site Patient ID: [id number].

Site Accession #: [id number]

: 1980

Study: XRay Extremity Right elbow [id number]/11/2018 10:14:04 PM

Ordering Physician: Doctor Temp



Final Report: 

INDICATION: Fall, elbow pain



TECHNIQUE: Elbow radiograph 2 views right



COMPARISON: None



FINDINGS: 

Bones: No acute fractures or aggressive bone lesions are identified. 



Joints: The elbow joint is unremarkable. No significant displacement of the 
anterior or posterior fat pads noted to suggest an effusion. 



Soft tissues: Unremarkable. No radiopaque foreign bodies are seen. 



IMPRESSION: 

1. No acute osseous injuries or abnormalities are noted. 



Dictated by: Denilson Andersen MD @ 2018 22:20:57

(Electronic Signature)



Report Signed by Proxy.
SUGEY
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
## 2019-12-12 NOTE — CT
EXAM DATE: 12/12/19



PATIENT'S AGE: 39



CT cervical spine



Technique: Multiple axial sections were obtained from above C1 inferiorly to 
the top of T2.  Reconstructed sagittal and coronal images were reviewed.



Findings: Previous surgery is noted at C5-6 and C6-7 causing slight artifact.  
Other disc spaces and vertebral body heights are maintained.  



No bony central or bony neural foraminal stenosis is seen.  Mild degenerative 
apophyseal change is scattered within the cervical spine.  Mild kyphosis is 
noted of the cervical spine.  



No fracture or abnormal subluxation is seen.



Impression:

1.  Prior surgery at C5-6 and C6-7.

2.  Mild degenerative change as noted above.

3.  Kyphosis possibly positional.

4.  Nothing acute is identified.



Diagnostic code #2



This report was dictated in [place] Standard Time



Report Signed by Proxy.



[facility]
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
## 2019-12-12 NOTE — CT
EXAM DATE: 12/12/19



PATIENT'S AGE: 39



Head CT



Technique: Multiple axial sections through the brain were obtained.



Comparison: No prior intracranial imaging is available.



Findings: Ventricles along with basal cisterns and sulci over the convexities 
appear within normal limits for the patient's age.  No abnormal parenchymal 
densities are seen.  No evidence of intracranial hemorrhage.  No midline shift 
or mass effect is seen.



Visualized sinuses show nothing acute.  Mastoid sinuses are clear.  No acute 
calvarial abnormality is seen.



Impression:

1.  Nothing acute is appreciated on noncontrast head CT exam.



Diagnostic code #1



This report was dictated in [place] Standard Time



Report Signed by Proxy.



[facility]

## 2019-12-12 NOTE — CR
EXAM DATE: 12/12/19



PATIENT'S AGE: 39



Chest: Portable view of the chest was obtained.



Comparison: No prior chest x-ray.



Heart size and mediastinum are normal.  Lungs are clear.  Previous cervical 
spine surgery is noted.  Bony structures appear grossly intact.



Impression:

1.  Nothing acute is seen on portable chest x-ray.



Diagnostic code #1



This report was dictated in [place] Standard Time



Report Signed by Proxy.



[facility]

## 2020-05-30 ENCOUNTER — HOSPITAL ENCOUNTER (EMERGENCY)
Dept: HOSPITAL 56 - MW.ED | Age: 40
Discharge: HOME | End: 2020-05-30
Payer: MEDICAID

## 2020-05-30 VITALS — DIASTOLIC BLOOD PRESSURE: 64 MMHG | SYSTOLIC BLOOD PRESSURE: 110 MMHG | HEART RATE: 104 BPM

## 2020-05-30 DIAGNOSIS — F32.9: ICD-10-CM

## 2020-05-30 DIAGNOSIS — J02.9: Primary | ICD-10-CM

## 2020-05-30 DIAGNOSIS — Z79.899: ICD-10-CM

## 2020-05-30 PROCEDURE — 96372 THER/PROPH/DIAG INJ SC/IM: CPT

## 2020-05-30 PROCEDURE — 87186 SC STD MICRODIL/AGAR DIL: CPT

## 2020-05-30 PROCEDURE — 99283 EMERGENCY DEPT VISIT LOW MDM: CPT

## 2020-05-30 PROCEDURE — 87070 CULTURE OTHR SPECIMN AEROBIC: CPT

## 2020-05-30 NOTE — EDM.PDOC
[facility] GENERAL MEDICAL PROBLEM





- General


Chief Complaint: ENT Problem


Stated Complaint: SORE THROAT


Time Seen by Provider: 20 07:14


Source of Information: Reports: Patient, RN


History Limitations: Reports: No Limitations





- History of Present Illness


INITIAL COMMENTS - FREE TEXT/NARRATIVE: 


HISTORY AND PHYSICAL:





History of present illness:


This 40-year-old female with a past medical history of anxiety on Celexa, no 

longer on propranolol, presents to the emergency department complaining of a 

sore throat that is been going on for the last 3 days.  She complains of 

subjective fevers, body aches, no cough, pain with swallowing, no shortness of 

breath, and swelling of the lymph nodes.  She rates this as severe.  She 

complains of bilateral ear fullness or stuffiness.  Nothing is making this 

better.  She essentially slept all day yesterday.  She denies smoking.  She 

does not drink on a frequent basis.  She denies use of marijuana 

methamphetamine heroin or cocaine.  She is otherwise healthy until this began 3 

days ago.  No other associated signs or symptoms.  No other modifying, 

aggravating or alleviating factors.





Review of systems: 


A 10-point review of systems, other than pertinent positives and negatives as 

stated per HPI, is otherwise negative.





Past medical history: 


As per history of present illness and as reviewed below otherwise 

noncontributory.





Surgical history: 


As per history of present illness and as reviewed below otherwise 

noncontributory.





Social history: 


No reported history of drug or alcohol abuse.





Family history: 


As per history of present illness and as reviewed below otherwise 

noncontributory.





Physical exam:


VITAL SIGNS:  Reviewed.


GENERAL: Appears to be in mild distress and uncomfortable.  The patient is 

wearing a mask on my initial evaluation


HEAD: No signs of head trauma.


EYES: Pupils are equal.  Extraocular motions intact.


EARS: Hearing is grossly intact, the patient has bilateral serous otitis 

without erythema or bulging.


MOUTH: Bilateral tonsillar swelling and exudate.


NECK: Bilateral anterior cervical lymphadenopathy it is tender.  No jugular 

venous distention.  No posterior lymphadenopathy   


CHEST: Chest with clear breath sounds bilaterally.  No wheezes, rales, or 

rhonchi.


CARDIAC: Regular rate and rhythm.  Normal S1 and S2, without murmurs, gallops, 

or rubs.


VASCULAR: Peripheral pulses normal and equal in all extremities.


ABDOMEN: Soft, no significant tenderness, no splenomegaly noted, no 

hepatomegaly noted.


MUSCULOSKELETAL: Good range of motion of all major joints. Extremities without 

clubbing, cyanosis or edema.


NEUROLOGIC EXAM: Alert and oriented x 3.  No focal sensory or motor deficits.  

Speech normal.  Follows commands.


PSYCHIATRIC: Mood normal.


SKIN: No rash or lesions.








Initial Differential Diagnosis & Plan:


Differential diagnosis includes strep pharyngitis, retropharyngeal abscess, 

meningitis, otitis media, peritonsillar abscess.





The patient does not have unilateral swelling.  No meningeal signs.  No pain 

with range of motion of the neck.  Given these findings it appears the patient 

has strep pharyngitis.  Her Centor criteria is 4 out of 4.  I feel that this 

most likely represents strep pharyngitis and will treat the patient with 

steroids, Bicillin, Tylenol and Motrin.  A throat culture will be sent.








Definitive disposition and diagnosis as appropriate pending reevaluation and 

review of above.











Onset: Gradual


  ** Throat


Pain Score (Numeric/FACES): 10





- Related Data


 Allergies











Allergy/AdvReac Type Severity Reaction Status Date / Time


 


No Known Allergies Allergy   Verified 19 15:23











Home Meds: 


 Home Meds





Citalopram [Celexa] 40 mg PO DAILY 17 [History]


Levonorgestrel [Mirena] 1 unit .ROUTE ASDIRECTED 17 [History]


Acetaminophen [Tylenol Extra Strength] 1,000 mg PO Q6HR PRN #60 tablet 20 

[Rx]


Ibuprofen [Motrin] 600 mg PO Q6H PRN #30 tab 20 [Rx]


Oxymetazoline HCl [Afrin] 2 spray NS BEDTIME #1 spray 20 [Rx]


Propranolol HCl [Propranolol] 10 mg PO DAILY 20 [History]











Past Medical History





- Past Health History


Medical/Surgical History: Denies Medical/Surgical History


HEENT History: Reports: None


Cardiovascular History: Reports: None


Respiratory History: Reports: None


Gastrointestinal History: Reports: GERD


Genitourinary History: Reports: None


OB/GYN History: Reports: Pregnancy


Musculoskeletal History: Reports: None


Neurological History: Reports: Migraines


Psychiatric History: Reports: Depression


Endocrine/Metabolic History: Reports: None


Hematologic History: Reports: None


Immunologic History: Reports: None


Oncologic (Cancer) History: Reports: None


Dermatologic History: Reports: None





- Infectious Disease History


Infectious Disease History: Reports: Chicken Pox





- Past Surgical History


Head Surgeries/Procedures: Reports: None


GI Surgical History: Reports: Cholecystectomy, Colonoscopy, EGD


Female  Surgical History: Reports:  Section





Social & Family History





- Family History


Family Medical History: Noncontributory





- Tobacco Use


Smoking Status *Q: Never Smoker





- Caffeine Use


Caffeine Use: Reports: Soda





- Recreational Drug Use


Recreational Drug Use: No





ED ROS ENT





- Review of Systems


Review Of Systems: Comprehensive ROS is negative, except as noted in HPI.





ED EXAM, ENT





- Physical Exam


Exam: See Below


Text/Narrative:: 





Please see my note above





Course





- Vital Signs


Last Recorded V/S: 





 Last Vital Signs











Temp  97.2 F   20 06:59


 


Pulse  104 H  20 06:59


 


Resp  18   20 06:59


 


BP  110/64   20 06:59


 


Pulse Ox  97   20 06:59














- Orders/Labs/Meds


Orders: 





 Active Orders 24 hr











 Category Date Time Status


 


 CULTURE THROAT [RM] Stat Lab  20 07:28 Ordered











Meds: 





Medications














Discontinued Medications














Generic Name Dose Route Start Last Admin





  Trade Name Freq  PRN Reason Stop Dose Admin


 


Acetaminophen  975 mg  20 07:23  





  Tylenol  PO  20 07:24  





  NOW ONE   





     





     





     





     


 


Dexamethasone  10 mg  20 07:25  





  Dexamethasone  PO  20 07:26  





  ONETIME ONE   





     





     





     





     


 


Ibuprofen  600 mg  20 07:23  





  Motrin  PO  20 07:24  





  ONETIME ONE   





     





     





     





     


 


Penicillin G Benzathine  1.2 millunits  20 07:25  





  Bicillin L-A  IM  20 07:26  





  ONETIME ONE   





     





     





     





     














Departure





- Departure


Time of Disposition: 07:35


Disposition: Home, Self-Care 01


Clinical Impression: 


 Pharyngitis








- Discharge Information


*PRESCRIPTION DRUG MONITORING PROGRAM REVIEWED*: Not Applicable


*COPY OF PRESCRIPTION DRUG MONITORING REPORT IN PATIENT CLAUS: Not Applicable


Prescriptions: 


Acetaminophen [Tylenol Extra Strength] 1,000 mg PO Q6HR PRN #60 tablet


 PRN Reason: Pain


Ibuprofen [Motrin] 600 mg PO Q6H PRN #30 tab


 PRN Reason: Pain


Oxymetazoline HCl [Afrin] 2 spray NS BEDTIME #1 spray


Instructions:  Strep Throat, Adult, Easy-to-Read, Pharyngitis, Easy-to-Read


Referrals: 


Nataly Lock, NP [Primary Care Provider] - 


Additional Instructions: 


The following information is given to patients seen in the emergency department 

who are being discharged to home. This information is to outline your options 

for follow-up care. We provide all patients seen in our emergency department 

with a follow-up referral.





The need for follow-up, as well as the timing and circumstances, are variable 

depending upon the specifics of your emergency department visit.





If you don't have a primary care physician on staff, we will provide you with a 

referral. We always advise you to contact your personal physician following an 

emergency department visit to inform them of the circumstance of the visit and 

for follow-up with them and/or the need for any referrals to a consulting 

specialist.





The emergency department will also refer you to a specialist when appropriate. 

This referral assures that you have the opportunity for follow-up care with a 

specialist. All of these measure are taken in an effort to provide you with 

optimal care, which includes your follow-up.





Under all circumstances we always encourage you to contact your private 

physician who remains a resource for coordinating your care. When calling for 

follow-up care, please make the office aware that this follow-up is from your 

recent emergency room visit. If for any reason you are refused follow-up, 

please contact the [facility] Emergency 

Department at [phone] and asked to speak to the emergency department 

charge nurse.








Thank you very much for coming to [facility] where we were able to take 

care of you today.  Please return to the emergency department if you are not 

able to swallow your medications, have worsening of your symptoms, have pain 

with range of motion of your neck, or any other concerns.  We are always happy 

to see you.





Edis Mullen MD








Sepsis Event Note





- Evaluation


Sepsis Screening Result: No Definite Risk





- Focused Exam


Vital Signs: 





 Vital Signs











  Temp Pulse Resp BP Pulse Ox


 


 20 06:59  97.2 F  104 H  18  110/64  97











Date Exam was Performed: 20


Time Exam was Performed: 07:30





- My Orders


Last 24 Hours: 





My Active Orders





20 07:28


CULTURE THROAT [RM] Stat 














- Assessment/Plan


Last 24 Hours: 





My Active Orders





20 07:28


CULTURE THROAT [RM] Stat

## 2021-10-11 ENCOUNTER — HOSPITAL ENCOUNTER (EMERGENCY)
Dept: HOSPITAL 56 - MW.ED | Age: 41
Discharge: HOME | End: 2021-10-11
Payer: MEDICAID

## 2021-10-11 VITALS — SYSTOLIC BLOOD PRESSURE: 129 MMHG | DIASTOLIC BLOOD PRESSURE: 68 MMHG | HEART RATE: 84 BPM

## 2021-10-11 DIAGNOSIS — J02.9: Primary | ICD-10-CM

## 2021-10-11 NOTE — EDM.PDOC
ED HPI GENERAL MEDICAL PROBLEM





- General


Chief Complaint: ENT Problem


Stated Complaint: SORE THROAT


Time Seen by Provider: 10/11/21 10:26


Source of Information: Reports: Patient


History Limitations: Reports: No Limitations





- History of Present Illness


INITIAL COMMENTS - FREE TEXT/NARRATIVE: 


HISTORY AND PHYSICAL:





History of present illness:


Patient is a 41-year-old female presents emergency room today with concern of 

sore throat x4 to 5 days. Patient states that she does have a history of 

frequent strep throat infections and is in the process of seeing an ear nose and

throat specialist and states she has an appointment scheduled in December to get

her tonsils removed. Patient states that she has been able to eat and drink but 

does have pain with swallowing. Denies any other symptoms or concerns.





Patient denies fever, chills, chest pain, shortness of breath, or cough. Denies 

headache, neck stiff ness, change in vision, syncope, or near syncope. Denies 

nausea, vomiting, abdominal pain, diarrhea, constipation, or dysuria. Has not 

noted any blood in urine or stool. Patient has been eating and drinking 

appropriately.





Review of systems: 


As per history of present illness and below otherwise all systems reviewed and 

negative.





Past medical history: 


As per history of present illness and as reviewed below otherwise 

noncontributory.





Surgical history: 


As per history of present illness and as reviewed below otherwise 

noncontributory.





Social history: 


See social history for further information





Family history: 


As per history of present illness and as reviewed below otherwise 

noncontributory.





Physical exam:


General: Patient is alert, oriented, and in no acute distress. Patient sitting 

comfortably on exam table. Vitals stable and reviewed by me.


HEENT: Atraumatic, normocephalic, pupils equal and reactive bilaterally, 

negative for conjunctival pallor or scleral icterus, mucous membranes moist, 

tonsils are moderately enlarged without exudate bilaterally without 

touching/tonsils equal. uvula midline, neck supple, nontender, trachea midline. 

No drooling or trismus noted. No meningeal signs. No hot potato voice noted. 


Lungs: Clear to auscultation, breath sounds equal bilaterally, chest nontender.


Heart: S1S2, regular rate and rhythm without overt murmur


Abdomen: Soft, nondistended, nontender. Negative for masses or 

hepatosplenomegaly. Negative for costovertebral tenderness.


Pelvis: Stable nontender.


Genitourinary: Deferred.


Rectal: Deferred.


Skin: Intact, warm, dry. No lesions or rashes noted.


Extremities: Atraumatic, negative for cords or calf pain. Neurovascular 

unremarkable.


Neuro: Awake, alert, oriented. Cranial nerves II through XII unremarkable. 

Cerebellum unremarkable. Motor and sensory unremarkable throughout. Exam 

nonfocal.





Notes:


Signs and symptoms that were prompt return to the ED thoroughly discussed with 

patient. Discussed importance of follow-up with a primary care provider and ENT.


Voices understanding and is agreeable to plan of care. Denies any further 

questions or concerns at this time.





Diagnostics:


None





Therapeutics:


None





Prescription:


Clindamycin, (penicillin allergy)





Impression: 


Exudative pharyngitis





Plan:


1. Use cough drops and/or other over the counter medications as needed for 

throat discomfort as discussed. Drink small but frequent sips of fluid to 

prevent dehydration. Medication as prescribed.


2. Alternate Ibuprofen and Tylenol as directed for pain and discomfort.


3. Follow up with your primary care provider as discussed.


4. Return to the ED as needed and as discussed.








Definitive disposition and diagnosis as appropriate pending reevaluation and 

review of above.





 














- Related Data


                                    Allergies











Allergy/AdvReac Type Severity Reaction Status Date / Time


 


No Known Allergies Allergy   Verified 19 15:23











Home Meds: 


                                    Home Meds





Citalopram [Celexa] 40 mg PO DAILY 17 [History]


Levonorgestrel [Mirena] 1 unit .ROUTE ASDIRECTED 17 [History]


Acetaminophen [Tylenol Extra Strength] 1,000 mg PO Q6HR PRN #60 tablet 20 

[Rx]


Ibuprofen [Motrin] 600 mg PO Q6H PRN #30 tab 20 [Rx]


Oxymetazoline HCl [Afrin] 2 spray NS BEDTIME #1 spray 20 [Rx]


Propranolol HCl [Propranolol] 10 mg PO DAILY 20 [History]


Clindamycin HCl 450 mg PO TID 10 Days #90 capsule 10/11/21 [Rx]











Past Medical History





- Past Health History


Medical/Surgical History: Denies Medical/Surgical History


HEENT History: Reports: None


Cardiovascular History: Reports: None


Respiratory History: Reports: None


Gastrointestinal History: Reports: GERD


Genitourinary History: Reports: None


OB/GYN History: Reports: Pregnancy


Musculoskeletal History: Reports: None


Neurological History: Reports: Migraines


Psychiatric History: Reports: Depression


Endocrine/Metabolic History: Reports: None


Hematologic History: Reports: None


Immunologic History: Reports: None


Oncologic (Cancer) History: Reports: None


Dermatologic History: Reports: None





- Infectious Disease History


Infectious Disease History: Reports: Chicken Pox





- Past Surgical History


Head Surgeries/Procedures: Reports: None


GI Surgical History: Reports: Cholecystectomy, Colonoscopy, EGD


Female  Surgical History: Reports:  Section





Social & Family History





- Family History


Family Medical History: No Pertinent Family History





- Caffeine Use


Caffeine Use: Reports: Soda





ED ROS GENERAL





- Review of Systems


Review Of Systems: Comprehensive ROS is negative, except as noted in HPI.





ED EXAM, GENERAL





- Physical Exam


Exam: See Below (See dictation)





Course





- Vital Signs


Last Recorded V/S: 





                                Last Vital Signs











Temp  96.9 F   10/11/21 11:27


 


Pulse  84   10/11/21 11:27


 


Resp  18   10/11/21 11:27


 


BP  129/68   10/11/21 11:27


 


Pulse Ox  99   10/11/21 11:27














Departure





- Departure


Time of Disposition: 11:34


Disposition: Home, Self-Care 01


Clinical Impression: 


 Exudative pharyngitis








- Discharge Information


Prescriptions: 


Clindamycin HCl 450 mg PO TID 10 Days #90 capsule


Referrals: 


PCP,None [Primary Care Provider] - 


Additional Instructions: 


The following information is given to patients seen in the emergency department 

who are being discharged to home. This information is to outline your options 

for follow-up care. We provide all patients seen in our emergency department 

with a follow-up referral.





The need for follow-up, as well as the timing and circumstances, are variable 

depending upon the specifics of your emergency department visit.





If you don't have a primary care physician on staff, we will provide you with a 

referral. We always advise you to contact your personal physician following an 

emergency department visit to inform them of the circumstance of the visit and 

for follow-up with them and/or the need for any referrals to a consulting 

specialist.





The emergency department will also refer you to a specialist when appropriate. 

This referral assures that you have the opportunity for follow-up care with a 

specialist. All of these measure are taken in an effort to provide you with 

optimal care, which includes your follow-up.





Under all circumstances we always encourage you to contact your private 

physician who remains a resource for coordinating your care. When calling for 

follow-up care, please make the office aware that this follow-up is from your 

recent emergency room visit. If for any reason you are refused follow-up, please

contact the [facility] Emergency Department

at [phone] and asked to speak to the emergency department charge nurse.





[facility]


Primary Care


[address] [place] [address]


Phone: [phone]


Fax: [phone]





[facility]


[address] [place] [address]


Phone: [phone]


Fax: [phone]





1. Use cough drops and/or other over the counter medications as needed for 

throat discomfort as discussed. Drink small but frequent sips of fluid to 

prevent dehydration. Medication as prescribed.


2. Alternate Ibuprofen and Tylenol as directed for pain and discomfort.


3. Follow up with your primary care provider as discussed.


4. Return to the ED as needed and as discussed.








 











Sepsis Event Note (ED)





- Evaluation


Sepsis Screening Result: No Definite Risk





- Focused Exam


Vital Signs: 





                                   Vital Signs











  Temp Pulse Resp BP Pulse Ox


 


 10/11/21 11:27  96.9 F  84  18  129/68  99